# Patient Record
Sex: FEMALE | Race: WHITE | NOT HISPANIC OR LATINO | Employment: FULL TIME | URBAN - METROPOLITAN AREA
[De-identification: names, ages, dates, MRNs, and addresses within clinical notes are randomized per-mention and may not be internally consistent; named-entity substitution may affect disease eponyms.]

---

## 2022-11-14 ENCOUNTER — HOSPITAL ENCOUNTER (EMERGENCY)
Facility: HOSPITAL | Age: 47
Discharge: HOME | End: 2022-11-14
Attending: EMERGENCY MEDICINE | Admitting: EMERGENCY MEDICINE
Payer: COMMERCIAL

## 2022-11-14 ENCOUNTER — APPOINTMENT (OUTPATIENT)
Dept: RADIOLOGY | Facility: HOSPITAL | Age: 47
End: 2022-11-14
Payer: COMMERCIAL

## 2022-11-14 ENCOUNTER — APPOINTMENT (OUTPATIENT)
Dept: RADIOLOGY | Facility: HOSPITAL | Age: 47
End: 2022-11-14
Attending: EMERGENCY MEDICINE
Payer: COMMERCIAL

## 2022-11-14 VITALS
BODY MASS INDEX: 24.84 KG/M2 | HEIGHT: 62 IN | WEIGHT: 135 LBS | TEMPERATURE: 97.5 F | OXYGEN SATURATION: 100 % | SYSTOLIC BLOOD PRESSURE: 106 MMHG | RESPIRATION RATE: 18 BRPM | HEART RATE: 70 BPM | DIASTOLIC BLOOD PRESSURE: 65 MMHG

## 2022-11-14 DIAGNOSIS — S52.532A CLOSED COLLES' FRACTURE OF LEFT RADIUS, INITIAL ENCOUNTER: ICD-10-CM
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
## 2022-11-14 PROCEDURE — 250N000013 HC RX MED GY IP 250 OP 250 PS 637

## 2022-11-14 PROCEDURE — 73100 X-RAY EXAM OF WRIST: CPT | Mod: LT

## 2022-11-14 PROCEDURE — 99284 EMERGENCY DEPT VISIT MOD MDM: CPT | Mod: 25

## 2022-11-14 PROCEDURE — 999N000065 XR WRIST LEFT 2 VIEWS

## 2022-11-14 PROCEDURE — 25605 CLTX DST RDL FX/EPHYS SEP W/: CPT | Mod: LT

## 2022-11-14 PROCEDURE — 73130 X-RAY EXAM OF HAND: CPT | Mod: LT

## 2022-11-14 RX ORDER — ACETAMINOPHEN 325 MG/1
325 TABLET ORAL ONCE
Status: COMPLETED | OUTPATIENT
Start: 2022-11-14 | End: 2022-11-14

## 2022-11-14 RX ORDER — ONDANSETRON 4 MG/1
4 TABLET, ORALLY DISINTEGRATING ORAL EVERY 8 HOURS PRN
Qty: 20 TABLET | Refills: 0 | Status: SHIPPED | OUTPATIENT
Start: 2022-11-14 | End: 2022-11-21

## 2022-11-14 RX ORDER — OXYCODONE HYDROCHLORIDE 5 MG/1
5 TABLET ORAL EVERY 6 HOURS PRN
Qty: 8 TABLET | Refills: 0 | Status: SHIPPED | OUTPATIENT
Start: 2022-11-14 | End: 2022-11-17

## 2022-11-14 RX ORDER — OXYCODONE HYDROCHLORIDE 5 MG/1
5 TABLET ORAL ONCE
Status: COMPLETED | OUTPATIENT
Start: 2022-11-14 | End: 2022-11-14

## 2022-11-14 RX ADMIN — ACETAMINOPHEN 325 MG: 325 TABLET, FILM COATED ORAL at 09:55

## 2022-11-14 RX ADMIN — OXYCODONE HYDROCHLORIDE 5 MG: 5 TABLET ORAL at 10:21

## 2022-11-14 ASSESSMENT — ACTIVITIES OF DAILY LIVING (ADL)
ADLS_ACUITY_SCORE: 35
ADLS_ACUITY_SCORE: 35

## 2022-11-14 ASSESSMENT — ENCOUNTER SYMPTOMS
JOINT SWELLING: 1
NUMBNESS: 1
ARTHRALGIAS: 1
BACK PAIN: 0

## 2022-11-14 NOTE — DISCHARGE INSTRUCTIONS
Please bring this paperwork with you to your follow-up appointment.    You were seen in the urgent care/emergency department for fall and left forearm pain.     You had an xray done today, which did show a fracture of your radius bone. The fracture was mildly displaced (out of place from normal alignment), so this was reduced (put back in place) and a splint and sling were applied.     Please follow-up with orthopedics for ongoing management of your fracture such as possible casting or surgery. Any [facility] location is good, but the number and address for [facility] or [facility] locations are attached above. Please follow up with them in the next 1-2 days.     For your symptoms:  Please keep the arm in a sling for comfort. Keep the splint clean and do no shower with this or get wet.     Tylenol/ibuprofen as needed  You may take up to 650 mg of Tylenol (acetaminophen) up to 4 times daily and up to 600 mg of ibuprofen up to 4 times daily as needed for fever, pain.  Please do not take more than the daily maximum recommended dose (tylenol = 4 grams, ibuprofen = 2.4 grams) as it can cause harm to your liver, kidneys, stomach.  It is best to take ibuprofen with food. Please read labels of any over-the-counter medicine you may be taking as it may contain Tylenol (acetaminophen) or Advil (ibuprofen).     Oxycodone as needed for break through pain at night.     Follow up with your primary care provider for recheck in 3 days for ER follow-up and with [facility] in 1-2 days.     Return to the emergency department if you develop worsening pain, numbness or tingling in hand, difficulty moving wrist, fevers, chills or any other new worsening or concerning symptoms. We'd be happy to see you again.    Thank you for allowing us to be part of your care today.    Take care!  -Aziza Tyler PA-C

## 2022-11-14 NOTE — ED TRIAGE NOTES
-Patient out for a walk with dog and slipped on sidewalk. Patient injured left wrist with fall. Took Advil at home. Did not hit head. Patient alert.     Triage Assessment     Row Name 11/14/22 0943       Triage Assessment (Adult)    Airway WDL WDL       Cognitive/Neuro/Behavioral WDL    Cognitive/Neuro/Behavioral WDL WDL

## 2022-11-14 NOTE — ED PROVIDER NOTES
Emergency Department Staff Physician Note     I had a face to face encounter with this patient seen by the Advanced Practice Provider (MIGDALIA).  I have seen, examined, and discussed the patient with the MIGDALIA and agree with their assessment and plan of management.    Relevant HPI:     Karlee Sanchez is a 47 year old female who presents to the Emergency Department for evaluation of wrist injury.    Per patient they report having been walking their dogs as they do routinely this morning at 0800 (~3 hours ago) when they slipped on ice underneath the snow. Patient notes having caught herself with her left arm and having heard a snap. She also notes having landed onto her buttocks. Patient denies having hit her head as well as any loss of consciousness. She is not on any blood thinners. Patient is endorsing pain with swelling and tenderness to her left wrist following the fall. She notes the pain worsens with movement and touch. She reports difficulties moving her fingers as well as slight paresthesias and numbness. She took an ibuprofen prior to arrival here which has not alleviated symptoms. She reports the pain is a 6-7/10. Patient is right handed. Patient has been icing her left wrist with no complete relief. She denies any pain elsewhere such as her hips or back. Patient denies any other complaints at the time.    IZaira, am serving as a scribe to document services personally performed by Shira Marquez M.D., based on my observations and the provider's statements to me.   Shira FUENTES M.D, attest that Zaira Barrett was acting in a scribe capacity, has observed my performance of the services and has documented them in accordance with my direction.    ED Course:  10:50 AM I received the patient report from the MIGDALIA, Aziza Tyler PA-C. I agree with their assessment and plan of management, and I will see the patient.  11:04 AM I met with the patient to introduce myself, gather additional history, perform my initial  "exam, and discuss the plan.     Brief Physical Exam:  /65   Pulse 70   Temp 97.5  F (36.4  C) (Temporal)   Resp 18   Ht 1.575 m (5' 2\")   Wt 61.2 kg (135 lb)   SpO2 100%   BMI 24.69 kg/m       Constitutional:  Well developed, well nourished, no acute distress   EYES: Conjunctivae clear  HENT:  Atraumatic, normocephalic  Respiratory:  No respiratory distress, normal breath sounds  Cardiovascular:  Normal rate, normal rhythm, no murmurs, capillary refill normal.   GI:  Soft, nondistended, nontender, no palpable masses, no rebound, no guarding   Musculoskeletal:   Obvious deformity with dorsal angulation and swelling to the left wrist.  Good distal sensation, pulses.  No pain at the elbow with supination pronation or range of motion  Integument: Warm, Dry, No erythema, No rash.   Neurologic:  Alert & oriented, no focal deficits noted, ambulatory  Psych: Affect normal, Mood normal.    Procedures:    PROCEDURE:  1. Hematoma Block   2. Fracture Reduction    3. Splint Application   INDICATIONS:  Displaced fracture of distal radius   PROCEDURE PROVIDER: Dr Shira Marquez   MEDICATIONS: 10 mLs of 1% Lidocaine with epinephrine   PROCEDURE NOTE: HEMATOMA BLOCK:  Following the examination of the injured area and finding no contraindications for the procedure, the area overlying the fracture was prepped with chlorhexidine. Using a sterile technique and dorsal approach, the fracture site was identified and a 25 ga needle was advanced into the fracture hematoma, with hematoma verified by aspiration.  I proceeded to slowly inject the local anesthetic into the hematoma.      ORTHOPEDIC MANAGEMENT:  Bone/Joint Manipulation: Affected extremity was grasped and gradual traction was applied in-line with fracture. The fracture site was further manipulated manually until alignment was improved.      Fracture alignment improved post procedure.     SPLINTING/IMMOBILIZATION:   A Sugar tong splint made of Plaster was applied.  " After placement I checked and adjusted the fit to ensure proper positioning. Patient was more comfortable with the splint in place.  Sensation and circulation are intact after splint placement.   COMPLICATIONS: Patient tolerated procedure well, without complication            Impression / ED Plan:  I personally saw the patient and performed a substantive portion of the visit including all aspects of the medical decision making.     Karlee Sanchez is a 47 year old female presents to the ED for evaluation of left wrist pain after fall on outstretched hand.  X-rays obtained, Colles' fracture.  Hematoma block performed, reduced, repeat x-ray showed improved angulation.  Given sling, discharged home with Ortho follow-up.     1. Closed Colles' fracture of left radius, initial encounter    2. Fall, initial encounter        Shira Marquez M.D.  Staff Physician  [facility] EMERGENCY DEPARTMENT  [address] [place] [id number]  [phone]  Dept: [phone]     Shira Marquez MD  11/14/22 9484

## 2022-11-14 NOTE — Clinical Note
Karlee Sanchez was seen and treated in our emergency department on 11/14/2022.  She may return to work on 11/15/2022.  With restrictions such as no use of left arm/hand until fracture healed.      If you have any questions or concerns, please don't hesitate to call.      Aziza Tyler PA-C

## 2022-11-14 NOTE — ED PROVIDER NOTES
EMERGENCY DEPARTMENT ENCOUNTER      NAME: Karlee Sanchez  AGE: 47 year old female  YOB: 1975  MRN: [id number]  EVALUATION DATE & TIME: No admission date for patient encounter.    PCP: No primary care provider on file.    ED PROVIDER: Aziza Tyler PA-C      Chief Complaint   Patient presents with     Wrist Injury       FINAL IMPRESSION:  1. Closed Colles' fracture of left radius, initial encounter    2. Fall, initial encounter        MEDICAL DECISION MAKING:    Pertinent Labs & Imaging studies reviewed. (See chart for details)  Karlee Sanchez is a 47 year old female who presents for evaluation of left forearm pain after a fall sustained morning of evaluation.  Patient reports she was walking her dog and slipped on the ice falling onto her left hand denies hitting head or loss of consciousness..     On my initial evaluation, vital signs normal. On physical exam patient is awake, alert, no acute distress, but slightly uncomfortable appearing sitting in bed.  Left forearm is covered in ice packs.  On removal of ice packs, there is obvious deformity and swelling surrounding the dorsal aspect of her wrist and distal radius.  Significant tenderness on palpation over this area.  No tenderness on palpation of her wrist, carpal bones or other major joints.  No abrasions, ecchymosis or skin changes.  Normal cap refill.  Good pulses.  Able to wiggle fingers..    Differential diagnosis includes fracture, dislocation, muscular sprain, muscular strain, tendon or ligamentous injury . Emergency department workup included x-ray of left wrist and hand. Patient was given Tylenol and oxycodone with some improvement in symptoms.    X-ray notable for Comminuted fracture of the left radius with dorsal angulation of the distal fracture fragment. No extension into the joint. No carpal fractures are identified.  Hematoma digital block was performed and fracture was reduced and forearm was splinted.  Neurovascular exam  intact following this procedure and patient tolerated appropriately.  Repeat x-ray imaging good improvement of angulation.  Patient was given referral for orthopedic surgery follow-up and strict return precautions to the emergency department.    Patient has had serial examinations and notes significant improvement.     Patient was discharged in stable condition with treatment plan as below. Instructed to follow up with primary care provider in 3 days and with [facility] in 1 to 2 days and return to the emergency department with any new or worsening of symptoms. Patient expressed understanding, feels comfortable, and is in agreement with this plan. All questions addressed prior to discharge.    Medical Decision Making    Supplemental history from: N/A    External Record(s) Reviewed: N/A    Differential Diagnosis: See MDM charting for differential considered.     I performed an independent interpretation of the: N/A    Discussed with radiology regarding test interpretation: N/A    Discussion of management with another provider: See chart documentation, if applicable and See ED Course    The following testing was considered but ultimately not selected: None     I considered prescription management with: Symptomatic Management    The patient's care impacted: None    Consideration of Admission/Observation: N/A - Patient discharged without consideration for admission    Care significantly affected by Social Determinants of Health including: N/A    ED COURSE:  10:42 AM  I reviewed the patient's chart. I met with the patient to gather history and to perform my initial exam.    I wore appropriate PPE during this encounter including: facemask & eye protection   10:49 AM I staffed this patient case with Dr. Marquez who agrees with plan at this time and will see the patient for their history, exam, and complete evaluation.  11:04 AM I rechecked the patient and performed hematoma block with reduction and splinting.  1:09  PM Rechecked patient and updated them on results. We discussed plan for discharge including treatment plan, follow-up and return precautions to emergency department.  Patient voiced understanding and in agreement with this plan.    At the conclusion of the encounter I discussed the results of all of the tests and the disposition. The questions were answered. The patient or family acknowledged understanding and was agreeable with the care plan.     MEDICATIONS GIVEN IN THE EMERGENCY:  Medications   acetaminophen (TYLENOL) tablet 325 mg (325 mg Oral Given 11/14/22 0955)   oxyCODONE (ROXICODONE) tablet 5 mg (5 mg Oral Given 11/14/22 1021)       NEW PRESCRIPTIONS STARTED AT TODAY'S ER VISIT  Discharge Medication List as of 11/14/2022  1:18 PM      START taking these medications    Details   ondansetron (ZOFRAN ODT) 4 MG ODT tab Take 1 tablet (4 mg) by mouth every 8 hours as needed for nausea, Disp-20 tablet, R-0, Local Print      oxyCODONE (ROXICODONE) 5 MG tablet Take 1 tablet (5 mg) by mouth every 6 hours as needed for breakthrough pain, Disp-8 tablet, R-0, Local Print                  =================================================================    HPI:    Patient information was obtained from: Patient    Use of Interpretor: N/A       Karlee Sanchez is a 47 year old female, otherwise healthy, who presents to this ED via private vehicle for evaluation of a mechanical fall.     Per patient they report having been walking their dogs as they do routinely this morning at 0800 (~3 hours ago) when they slipped on ice underneath the snow. Patient notes having caught herself with her left arm and having heard a snap. She also notes having landed onto her buttocks. Patient denies having hit her head as well as any loss of consciousness. She is not on any blood thinners. Patient is endorsing pain with swelling and tenderness to her left wrist following the fall. She notes the pain worsens with movement and touch. She  "reports difficulties moving her fingers as well as slight paresthesias and numbness. She took an ibuprofen prior to arrival here which has not alleviated symptoms. She reports the pain is a 6-7/10. Patient is right handed. Patient has been icing her left wrist with no complete relief. She denies any pain elsewhere such as her hips or back. Patient denies any other complaints at the time.     REVIEW OF SYSTEMS:  Review of Systems   Musculoskeletal: Positive for arthralgias (to left wrist with tenderness and swelling) and joint swelling (of left wrist). Negative for back pain.   Neurological: Positive for numbness (and paresthesias to left hand and wrist). Negative for syncope (no loss of consciousness).   All other systems reviewed and are negative.       PAST MEDICAL HISTORY:  History reviewed. No pertinent past medical history.    PAST SURGICAL HISTORY:  History reviewed. No pertinent surgical history.    CURRENT MEDICATIONS:    No current facility-administered medications for this encounter.    Current Outpatient Medications:      ondansetron (ZOFRAN ODT) 4 MG ODT tab, Take 1 tablet (4 mg) by mouth every 8 hours as needed for nausea, Disp: 20 tablet, Rfl: 0     oxyCODONE (ROXICODONE) 5 MG tablet, Take 1 tablet (5 mg) by mouth every 6 hours as needed for breakthrough pain, Disp: 8 tablet, Rfl: 0    ALLERGIES:  No Known Allergies    FAMILY HISTORY:  No family history on file.    SOCIAL HISTORY:   Social History     Socioeconomic History     Marital status:        VITALS:  Patient Vitals for the past 24 hrs:   BP Temp Temp src Pulse Resp SpO2 Height Weight   11/14/22 1045 106/65 -- -- 70 -- 100 % -- --   11/14/22 0941 114/67 97.5  F (36.4  C) Temporal 61 18 100 % 1.575 m (5' 2\") 61.2 kg (135 lb)       PHYSICAL EXAM    Constitutional: Well developed, Well nourished, NAD, slightly uncomfortable appearing   HENT: Normocephalic, Atraumatic, Bilateral external ears normal, Oropharynx normal, mucous membranes moist, " Nose normal.   Neck: Normal range of motion, No tenderness, Supple, No stridor.  Eyes: PERRL, EOMI, Conjunctiva normal, No discharge.   Respiratory: Normal breath sounds, No respiratory distress, No wheezing, Speaks full sentences easily. No cough.  Cardiovascular: Normal heart rate, Regular rhythm, No murmurs, No rubs, No gallops. Chest wall nontender.  GI: Soft, No tenderness, No masses, No flank tenderness. No rebound or guarding.  Musculoskeletal: 2+ DP pulses. Left forearm is covered in ice packs.  On removal of ice packs, there is obvious deformity and swelling surrounding the dorsal aspect of her wrist and distal radius.  Significant tenderness on palpation over this area.  No tenderness on palpation of her wrist, carpal bones or other major joints.  No abrasions, ecchymosis or skin changes.  Normal cap refill.  Good pulses.  Able to wiggle fingers..  Integument: Warm, Dry, No erythema, No rash. No petechiae.  Neurologic: Alert & oriented x 3, Normal motor function, Normal sensory function, No focal deficits noted. Normal gait.  Psychiatric: Affect normal, Judgment normal, Mood normal. Cooperative.    LAB:  All pertinent labs reviewed and interpreted.  Labs Ordered and Resulted from Time of ED Arrival to Time of ED Departure - No data to display    RADIOLOGY:  Reviewed all pertinent imaging. Please see official radiology report.  XR Wrist Left 2 Views   Final Result   IMPRESSION: Slightly improved angulation of the mildly comminuted distal radius fracture status post reduction and splint placement.      XR Hand Left G/E 3 Views   Final Result   IMPRESSION: Comminuted fracture of the left radius with dorsal angulation of the distal fracture fragment. No extension into the joint. No carpal fractures are identified. Left hand otherwise negative.          EKG:    None.     PROCEDURES:   PROCEDURE:  1. Hematoma Block   2. Fracture Reduction    3. Splint Application   INDICATIONS:  Displaced fracture of left radius  with dorsal angulation   PROCEDURE PROVIDER: zAiza Tyler PA-C   MEDICATIONS: 5 mLs of 1% Lidocaine without epinephrine   PROCEDURE NOTE: HEMATOMA BLOCK:  Following the examination of the injured area and finding no contraindications for the procedure, the area overlying the fracture was prepped with chlorhexidine. Using a sterile technique and dorsal approach, the fracture site was identified and a 25 ga needle was advanced into the fracture hematoma, with hematoma verified by aspiration.  I proceeded to slowly inject the local anesthetic into the hematoma.      ORTHOPEDIC MANAGEMENT:  Bone/Joint Manipulation: Affected extremity was grasped and gradual traction was applied in-line with fracture. The fracture site was further manipulated manually until alignment was improved.      Fracture alignment improved post procedure.     SPLINTING/IMMOBILIZATION:   A Sugar tong splint made of Plaster was applied.  After placement I checked and adjusted the fit to ensure proper positioning. Patient was more comfortable with the splint in place.  Sensation and circulation are intact after splint placement.   COMPLICATIONS: Patient tolerated procedure well, without complication       Diagnosis:  1. Closed Colles' fracture of left radius, initial encounter    2. Fall, initial encounter            I, Apple Branham, am serving as a scribe to document services personally performed by Aziza Tyler PA-C based on my observation and the provider's statements to me. I, Aziza Tyler PA-C attest that Apple Branham is acting in a scribe capacity, has observed my performance of the services and has documented them in accordance with my direction.    Aziza Tyler PA-C  Emergency Medicine  [facility]  11/14/2022     Aziza Tyler PA-C  11/14/22 1726

## 2023-12-15 ENCOUNTER — HOSPITAL ENCOUNTER (EMERGENCY)
Facility: HOSPITAL | Age: 48
Discharge: HOME | End: 2023-12-15
Admitting: EMERGENCY MEDICINE
Payer: COMMERCIAL

## 2023-12-15 VITALS
HEIGHT: 62 IN | BODY MASS INDEX: 25.76 KG/M2 | OXYGEN SATURATION: 99 % | TEMPERATURE: 97.7 F | WEIGHT: 140 LBS | HEART RATE: 62 BPM | RESPIRATION RATE: 16 BRPM | DIASTOLIC BLOOD PRESSURE: 74 MMHG | SYSTOLIC BLOOD PRESSURE: 125 MMHG

## 2023-12-15 PROCEDURE — 99281 EMR DPT VST MAYX REQ PHY/QHP: CPT

## 2023-12-15 NOTE — ED TRIAGE NOTES
The patient was seen at [facility] on 12/14/2023 and DX with Ovarian Cyst, pt was sent home and told if the pain worsens she should be seen in the ER; pt states the pain today is a 7/10,pt took tylenol and ibuprofen and is still having a lot of pain.      Triage Assessment (Adult)       Row Name 12/15/23 2568          Triage Assessment    Airway WDL WDL        Respiratory WDL    Respiratory WDL WDL        Skin Circulation/Temperature WDL    Skin Circulation/Temperature WDL WDL        Cardiac WDL    Cardiac WDL WDL     Cardiac Rhythm NSR        Peripheral/Neurovascular WDL    Peripheral Neurovascular WDL WDL        Cognitive/Neuro/Behavioral WDL    Cognitive/Neuro/Behavioral WDL WDL